# Patient Record
Sex: FEMALE | Race: WHITE | NOT HISPANIC OR LATINO | Employment: OTHER | URBAN - METROPOLITAN AREA
[De-identification: names, ages, dates, MRNs, and addresses within clinical notes are randomized per-mention and may not be internally consistent; named-entity substitution may affect disease eponyms.]

---

## 2018-05-21 ENCOUNTER — HOSPITAL ENCOUNTER (EMERGENCY)
Facility: HOSPITAL | Age: 78
Discharge: OTHER INSTITUTION | End: 2018-05-22
Attending: EMERGENCY MEDICINE | Admitting: INTERNAL MEDICINE
Payer: MEDICARE

## 2018-05-21 DIAGNOSIS — N73.9 PELVIC ABSCESS IN FEMALE: ICD-10-CM

## 2018-05-21 DIAGNOSIS — A41.9 SEPSIS, DUE TO UNSPECIFIED ORGANISM: Primary | ICD-10-CM

## 2018-05-21 DIAGNOSIS — L08.9 WOUND INFECTION: ICD-10-CM

## 2018-05-21 DIAGNOSIS — L89.154 SACRAL DECUBITUS ULCER, STAGE IV: ICD-10-CM

## 2018-05-21 DIAGNOSIS — T14.8XXA WOUND INFECTION: ICD-10-CM

## 2018-05-21 LAB
ALBUMIN SERPL BCP-MCNC: 2.3 G/DL
ALP SERPL-CCNC: 116 U/L
ALT SERPL W/O P-5'-P-CCNC: 50 U/L
ANION GAP SERPL CALC-SCNC: 11 MMOL/L
ANISOCYTOSIS BLD QL SMEAR: SLIGHT
AST SERPL-CCNC: 32 U/L
BASOPHILS # BLD AUTO: ABNORMAL K/UL
BASOPHILS NFR BLD: 0 %
BILIRUB SERPL-MCNC: 0.8 MG/DL
BILIRUB UR QL STRIP: NEGATIVE
BUN SERPL-MCNC: 60 MG/DL
CALCIUM SERPL-MCNC: 8.7 MG/DL
CHLORIDE SERPL-SCNC: 107 MMOL/L
CLARITY UR: CLEAR
CO2 SERPL-SCNC: 21 MMOL/L
COLOR UR: YELLOW
CREAT SERPL-MCNC: 1.3 MG/DL
DIFFERENTIAL METHOD: ABNORMAL
EOSINOPHIL # BLD AUTO: ABNORMAL K/UL
EOSINOPHIL NFR BLD: 1 %
ERYTHROCYTE [DISTWIDTH] IN BLOOD BY AUTOMATED COUNT: 14.5 %
EST. GFR  (AFRICAN AMERICAN): 45 ML/MIN/1.73 M^2
EST. GFR  (NON AFRICAN AMERICAN): 39 ML/MIN/1.73 M^2
GLUCOSE SERPL-MCNC: 222 MG/DL
GLUCOSE UR QL STRIP: NEGATIVE
HCT VFR BLD AUTO: 33.7 %
HGB BLD-MCNC: 11 G/DL
HGB UR QL STRIP: NEGATIVE
KETONES UR QL STRIP: NEGATIVE
LACTATE SERPL-SCNC: 3.6 MMOL/L
LEUKOCYTE ESTERASE UR QL STRIP: NEGATIVE
LYMPHOCYTES # BLD AUTO: ABNORMAL K/UL
LYMPHOCYTES NFR BLD: 4 %
MCH RBC QN AUTO: 28.8 PG
MCHC RBC AUTO-ENTMCNC: 32.7 G/DL
MCV RBC AUTO: 88 FL
MONOCYTES # BLD AUTO: ABNORMAL K/UL
MONOCYTES NFR BLD: 3 %
NEUTROPHILS NFR BLD: 89 %
NEUTS BAND NFR BLD MANUAL: 3 %
NITRITE UR QL STRIP: NEGATIVE
OVALOCYTES BLD QL SMEAR: ABNORMAL
PH UR STRIP: 6 [PH] (ref 5–8)
PLATELET # BLD AUTO: 306 K/UL
PLATELET BLD QL SMEAR: ABNORMAL
PMV BLD AUTO: 9.3 FL
POIKILOCYTOSIS BLD QL SMEAR: ABNORMAL
POTASSIUM SERPL-SCNC: 4.9 MMOL/L
PROT SERPL-MCNC: 6.1 G/DL
PROT UR QL STRIP: NEGATIVE
RBC # BLD AUTO: 3.82 M/UL
SODIUM SERPL-SCNC: 139 MMOL/L
SP GR UR STRIP: 1.02 (ref 1–1.03)
URN SPEC COLLECT METH UR: NORMAL
UROBILINOGEN UR STRIP-ACNC: NEGATIVE EU/DL
WBC # BLD AUTO: 21.5 K/UL

## 2018-05-21 PROCEDURE — 87040 BLOOD CULTURE FOR BACTERIA: CPT

## 2018-05-21 PROCEDURE — 99285 EMERGENCY DEPT VISIT HI MDM: CPT | Mod: 25

## 2018-05-21 PROCEDURE — 81003 URINALYSIS AUTO W/O SCOPE: CPT

## 2018-05-21 PROCEDURE — 96365 THER/PROPH/DIAG IV INF INIT: CPT | Mod: 59

## 2018-05-21 PROCEDURE — 83605 ASSAY OF LACTIC ACID: CPT

## 2018-05-21 PROCEDURE — 25500020 PHARM REV CODE 255

## 2018-05-21 PROCEDURE — 96368 THER/DIAG CONCURRENT INF: CPT

## 2018-05-21 PROCEDURE — S0077 INJECTION, CLINDAMYCIN PHOSP: HCPCS | Performed by: NURSE PRACTITIONER

## 2018-05-21 PROCEDURE — 93005 ELECTROCARDIOGRAM TRACING: CPT

## 2018-05-21 PROCEDURE — 87086 URINE CULTURE/COLONY COUNT: CPT

## 2018-05-21 PROCEDURE — 25000003 PHARM REV CODE 250: Performed by: NURSE PRACTITIONER

## 2018-05-21 PROCEDURE — 80053 COMPREHEN METABOLIC PANEL: CPT

## 2018-05-21 PROCEDURE — 96366 THER/PROPH/DIAG IV INF ADDON: CPT

## 2018-05-21 PROCEDURE — 63600175 PHARM REV CODE 636 W HCPCS: Performed by: NURSE PRACTITIONER

## 2018-05-21 PROCEDURE — 85027 COMPLETE CBC AUTOMATED: CPT

## 2018-05-21 PROCEDURE — 36415 COLL VENOUS BLD VENIPUNCTURE: CPT

## 2018-05-21 PROCEDURE — 85007 BL SMEAR W/DIFF WBC COUNT: CPT

## 2018-05-21 PROCEDURE — 93010 ELECTROCARDIOGRAM REPORT: CPT | Mod: ,,, | Performed by: INTERNAL MEDICINE

## 2018-05-21 RX ORDER — SODIUM CHLORIDE 9 MG/ML
INJECTION, SOLUTION INTRAVENOUS
Status: DISCONTINUED
Start: 2018-05-21 | End: 2018-05-22 | Stop reason: HOSPADM

## 2018-05-21 RX ORDER — B-COMPLEX WITH VITAMIN C
1 TABLET ORAL DAILY
COMMUNITY

## 2018-05-21 RX ORDER — VANCOMYCIN HCL IN 5 % DEXTROSE 1G/250ML
1000 PLASTIC BAG, INJECTION (ML) INTRAVENOUS
Status: COMPLETED | OUTPATIENT
Start: 2018-05-21 | End: 2018-05-22

## 2018-05-21 RX ORDER — CLINDAMYCIN PHOSPHATE 900 MG/50ML
900 INJECTION, SOLUTION INTRAVENOUS
Status: COMPLETED | OUTPATIENT
Start: 2018-05-21 | End: 2018-05-22

## 2018-05-21 RX ORDER — PNV NO.95/FERROUS FUM/FOLIC AC 28MG-0.8MG
100 TABLET ORAL DAILY
COMMUNITY

## 2018-05-21 RX ADMIN — SODIUM CHLORIDE 500 ML: 9 INJECTION, SOLUTION INTRAVENOUS at 11:05

## 2018-05-21 RX ADMIN — CLINDAMYCIN IN 5 PERCENT DEXTROSE 900 MG: 18 INJECTION, SOLUTION INTRAVENOUS at 11:05

## 2018-05-21 RX ADMIN — IOHEXOL 75 ML: 350 INJECTION, SOLUTION INTRAVENOUS at 10:05

## 2018-05-21 RX ADMIN — PIPERACILLIN AND TAZOBACTAM 3.38 G: 3; .375 INJECTION, POWDER, LYOPHILIZED, FOR SOLUTION INTRAVENOUS; PARENTERAL at 10:05

## 2018-05-22 VITALS
WEIGHT: 140 LBS | DIASTOLIC BLOOD PRESSURE: 77 MMHG | SYSTOLIC BLOOD PRESSURE: 141 MMHG | TEMPERATURE: 98 F | BODY MASS INDEX: 27.34 KG/M2 | OXYGEN SATURATION: 97 % | RESPIRATION RATE: 20 BRPM | HEART RATE: 72 BPM

## 2018-05-22 PROBLEM — L89.153 SACRAL DECUBITUS ULCER, STAGE III: Status: ACTIVE | Noted: 2018-05-22

## 2018-05-22 PROBLEM — D64.9 ANEMIA: Status: ACTIVE | Noted: 2018-05-22

## 2018-05-22 PROBLEM — R65.20 SEVERE SEPSIS: Status: ACTIVE | Noted: 2018-05-22

## 2018-05-22 PROBLEM — A41.9 SEVERE SEPSIS: Status: ACTIVE | Noted: 2018-05-22

## 2018-05-22 PROBLEM — K65.1 ABSCESS OF ABDOMINAL CAVITY: Status: ACTIVE | Noted: 2018-05-22

## 2018-05-22 PROBLEM — R53.81 PHYSICAL DECONDITIONING: Status: ACTIVE | Noted: 2018-05-22

## 2018-05-22 PROBLEM — J18.9 PNEUMONIA DUE TO INFECTIOUS ORGANISM: Status: ACTIVE | Noted: 2018-05-22

## 2018-05-22 PROBLEM — L89.159 SACRAL DECUBITUS ULCER: Status: ACTIVE | Noted: 2018-05-22

## 2018-05-22 PROBLEM — N17.9 AKI (ACUTE KIDNEY INJURY): Status: ACTIVE | Noted: 2018-05-22

## 2018-05-22 LAB — LACTATE SERPL-SCNC: 3.2 MMOL/L

## 2018-05-22 PROCEDURE — 25000003 PHARM REV CODE 250: Performed by: NURSE PRACTITIONER

## 2018-05-22 PROCEDURE — 36415 COLL VENOUS BLD VENIPUNCTURE: CPT

## 2018-05-22 PROCEDURE — 83605 ASSAY OF LACTIC ACID: CPT | Mod: 91

## 2018-05-22 PROCEDURE — 25000003 PHARM REV CODE 250: Performed by: EMERGENCY MEDICINE

## 2018-05-22 PROCEDURE — 63600175 PHARM REV CODE 636 W HCPCS: Performed by: NURSE PRACTITIONER

## 2018-05-22 RX ADMIN — SODIUM CHLORIDE 500 ML: 9 INJECTION, SOLUTION INTRAVENOUS at 12:05

## 2018-05-22 RX ADMIN — VANCOMYCIN HYDROCHLORIDE 1000 MG: 1 INJECTION, POWDER, LYOPHILIZED, FOR SOLUTION INTRAVENOUS at 12:05

## 2018-05-22 RX ADMIN — SODIUM CHLORIDE 1000 ML: 0.9 INJECTION, SOLUTION INTRAVENOUS at 02:05

## 2018-05-22 NOTE — ED PROVIDER NOTES
Encounter Date: 5/21/2018    SCRIBE #1 NOTE: I, Leatha Neumann, am scribing for, and in the presence of, Sofia Morales NP.       History     Chief Complaint   Patient presents with    Wound Check     Pt has developed a bed sore and daughter wants it checked. Having difficulty keeping her off the side where ulceration is.        05/21/2018 7:35 PM     Chief complaint: Wound Check    Alexandra Lassiter is a 78 y.o. female who presents to the ED for further evaluation of a pressure ulcer to the left buttock. Pt's Daughter reports that her Mother usually ambulates on her own, but over the past few weeks the pt has been ambulating less and staying in bed. Along with this, the pt has been incontinent lately. The daugther has a son with a heart condition, so she has been bathing her mother after every bout of incontinence in an attempt to maintain a more sterile household for her son. Pt has now developed a pressure ulcer to the left buttock over the past 5-6 days. The Daughter has been treating the ulcer with zinc oxide power and neosporin.   The patient denies fever or any other symptoms at this time. PMHx: Diverticulosis, HTN, Arthritis, Stroke, DM. No pertinent SHx noted. Allergies: Norvasc, Adhesive.       The history is provided by the patient and a relative. The history is limited by the condition of the patient.     Review of patient's allergies indicates:   Allergen Reactions    Norvasc [amlodipine] Other (See Comments)     dizzy    Adhesive Rash     Rash to paper tape     Past Medical History:   Diagnosis Date    Arthritis     Diabetes mellitus     Diverticulosis     Hypertension     Other chronic sinusitis     Persistent insomnia     Stroke      Past Surgical History:   Procedure Laterality Date    COLON SURGERY      d/t diverticulitis    COLONOSCOPY  pre-2005    polyps     Family History   Problem Relation Age of Onset    Hypertension Mother     Cancer Mother         lung    Heart disease Father      Hypertension Sister     Heart disease Sister     Heart disease Brother         x3     Cancer Brother         lung    Diabetes Neg Hx      Social History   Substance Use Topics    Smoking status: Never Smoker    Smokeless tobacco: Never Used    Alcohol use No     Review of Systems   Constitutional: Positive for activity change (decreased over past few wks). Negative for chills and fever.   HENT: Negative for congestion, rhinorrhea and sore throat.    Eyes: Negative for pain and redness.   Respiratory: Negative for cough and shortness of breath.    Cardiovascular: Negative for chest pain and palpitations.   Gastrointestinal: Negative for abdominal pain, diarrhea and nausea.   Genitourinary: Negative for dysuria, flank pain, frequency, hematuria and urgency.   Musculoskeletal: Negative for gait problem, myalgias and neck pain.   Skin: Positive for wound (pressure ulcer to L buttock). Negative for rash.   Neurological: Negative for dizziness, light-headedness and headaches.       Physical Exam     Initial Vitals [05/21/18 1916]   BP Pulse Resp Temp SpO2   125/61 92 (!) 28 -- 97 %      MAP       82.33         Physical Exam    Constitutional: Vital signs are normal. She appears well-developed and well-nourished. She is not diaphoretic. She is active. She does not have a sickly appearance. No distress.   HENT:   Head: Normocephalic and atraumatic.   Eyes: Conjunctivae are normal.   Neck: Normal range of motion and full passive range of motion without pain.   Cardiovascular: Normal rate, regular rhythm and normal heart sounds. Exam reveals no gallop and no friction rub.    No murmur heard.  Pulmonary/Chest: Breath sounds normal. She has no wheezes. She has no rhonchi. She has no rales.   Abdominal: Soft. Bowel sounds are normal. There is no tenderness.   Genitourinary:   Genitourinary Comments: No purulent drainage from vagina noted   Musculoskeletal: Normal range of motion.   Neurological: She is alert. Gait  normal.   Skin: Skin is warm and dry. Capillary refill takes less than 2 seconds.   See photo below of buttock decubitus. Patient also has a stage one decubitus to left lateral malleolus   Psychiatric: She has a normal mood and affect.                 ED Course   Critical Care  Date/Time: 5/21/2018 5:35 PM  Performed by: HAYES NEWBERRY  Authorized by: QING DE OLIVEIRA   Direct patient critical care time: 20 minutes  Additional history critical care time: 5 minutes  Ordering / reviewing critical care time: 5 minutes  Documentation critical care time: 5 minutes  Consulting other physicians critical care time: 7 minutes  Total critical care time (exclusive of procedural time) : 42 minutes  Critical care time was exclusive of separately billable procedures and treating other patients.  Critical care was necessary to treat or prevent imminent or life-threatening deterioration of the following conditions: sepsis.  Critical care was time spent personally by me on the following activities: discussions with consultants, evaluation of patient's response to treatment, obtaining history from patient or surrogate, ordering and review of laboratory studies, pulse oximetry, development of treatment plan with patient or surrogate, examination of patient, ordering and performing treatments and interventions, ordering and review of radiographic studies and re-evaluation of patient's condition.        Labs Reviewed - No data to display  EKG Readings: (Independently Interpreted)   Rhythm: Sinus Arrhythmia. Heart Rate: 86. Ectopy: No Ectopy. T Waves: Normal. Other Impression: No evidence of acute ischemia          Medical Decision Making:   History:   Old Medical Records: I decided to obtain old medical records.  Clinical Tests:   Lab Tests: Reviewed and Ordered  Radiological Study: Reviewed and Ordered  Medical Tests: Reviewed and Ordered       APC / Resident Notes:   Alexandra Lassiter is an 78 year old female presenting to the ED for  wound check. The patient has eschar covering the buttock wound and is noted to have a WBC of 21,000. Sepsis workup initiated after reviewing CBC. Lactic acid is 3.6 but patient has no septic shock. IV antibiotics and fluids initiated in the ED. CT of abdomen/pelvis performed to r/o necrotizing fasciitis/deep space infection. On CT, it was noted that patient has an abnormal appearing thickwalled collection at the level of prior hysterectomy measures 3.5 x 5.0 x  4.4 cm. Abscess is favored, quite possibly draining to the vagina. Given somewhat nodular  thickening, malignant process arising from the vaginal apex is possible.  Patient will require GYN consult for this CT finding. No purulent drainage noted from vagina on exam. Patient was accepted at [facility].        Scribe Attestation:   Scribe #1: I performed the above scribed service and the documentation accurately describes the services I performed. I attest to the accuracy of the note.    I, THADDEUS Gomez, personally performed the services described in this documentation. All medical record entries made by the scribe were at my direction and in my presence.  I have reviewed the chart and agree that the record reflects my personal performance and is accurate and complete. THADDEUS Gomez.  2:27 AM 05/22/2018          ED Course as of May 22 0226   Mon May 21, 2018   2106 I have concern for possible sepsis due to patient's WBC of 21,000. Sepsis workup initiated. Broad spectrum antibiotics will also be ordered to be given after blood cultures drawn.   [DD]      ED Course User Index  [DD] Sofia Morales NP     Clinical Impression:     1. Sepsis, due to unspecified organism    2. Wound infection                                 Sofia Morales NP  05/22/18 0232       Sofia Morales NP  06/28/18 0555

## 2018-05-22 NOTE — ED NOTES
Patient now accepted in transfer to [facility] ED by Dr Yoon, call report to:  [phone].  Transfer Center arranging transport.

## 2018-05-22 NOTE — ED NOTES
At transfer: to [facility] ED, Patient A A & O x 3, skin warm and dry, EKG, V-rad report, radiology discs with patient, saline lock in place with NS at TKO. O2 at 2l/NC.

## 2018-05-22 NOTE — ED NOTES
Patient and daughter have been updated on admission and urine results. No needs or questions at this time.

## 2018-05-22 NOTE — ED NOTES
Patient and daughter have updated on plan of care and results. No needs or questions at this time.

## 2018-05-22 NOTE — ED NOTES
Assumed care from:  BN:  Patient awake, alert and oriented x 3, skin warm and dry, in NAD with family at bedside, saline lock in place with fluids and antibiotic infusing.

## 2018-05-23 LAB — BACTERIA UR CULT: NO GROWTH

## 2018-05-25 PROBLEM — C51.9 PRIMARY VULVAR SQUAMOUS CELL CARCINOMA: Status: ACTIVE | Noted: 2018-05-25

## 2018-05-25 PROBLEM — I82.622 ACUTE DEEP VEIN THROMBOSIS (DVT) OF LEFT UPPER EXTREMITY: Status: ACTIVE | Noted: 2018-05-25

## 2018-05-27 LAB
BACTERIA BLD CULT: NORMAL
BACTERIA BLD CULT: NORMAL

## 2018-05-31 PROBLEM — N71.9 PYOMETRIUM: Status: ACTIVE | Noted: 2018-05-31

## 2018-06-01 PROBLEM — A41.9 SEVERE SEPSIS: Status: RESOLVED | Noted: 2018-05-22 | Resolved: 2018-06-01

## 2018-06-01 PROBLEM — N17.9 AKI (ACUTE KIDNEY INJURY): Status: RESOLVED | Noted: 2018-05-22 | Resolved: 2018-06-01

## 2018-06-01 PROBLEM — R65.20 SEVERE SEPSIS: Status: RESOLVED | Noted: 2018-05-22 | Resolved: 2018-06-01

## 2018-06-02 PROBLEM — N93.9 VAGINAL BLEEDING: Status: ACTIVE | Noted: 2018-06-02

## 2018-06-02 PROBLEM — R57.9 SHOCK, UNSPECIFIED: Status: ACTIVE | Noted: 2018-06-02

## 2018-06-02 PROBLEM — R57.9 SHOCK, UNSPECIFIED: Status: RESOLVED | Noted: 2018-06-02 | Resolved: 2018-06-02

## 2018-06-08 PROBLEM — Z93.3 S/P COLOSTOMY: Status: ACTIVE | Noted: 2018-06-08
